# Patient Record
Sex: MALE | Race: BLACK OR AFRICAN AMERICAN | NOT HISPANIC OR LATINO | URBAN - METROPOLITAN AREA
[De-identification: names, ages, dates, MRNs, and addresses within clinical notes are randomized per-mention and may not be internally consistent; named-entity substitution may affect disease eponyms.]

---

## 2021-08-10 ENCOUNTER — OUTPATIENT (OUTPATIENT)
Dept: OUTPATIENT SERVICES | Facility: HOSPITAL | Age: 66
LOS: 1 days | End: 2021-08-10
Payer: COMMERCIAL

## 2021-08-10 PROCEDURE — 73620 X-RAY EXAM OF FOOT: CPT

## 2021-08-10 PROCEDURE — 73620 X-RAY EXAM OF FOOT: CPT | Mod: 26,RT

## 2021-08-19 PROBLEM — Z00.00 ENCOUNTER FOR PREVENTIVE HEALTH EXAMINATION: Status: ACTIVE | Noted: 2021-08-19

## 2021-08-24 ENCOUNTER — APPOINTMENT (OUTPATIENT)
Dept: PHYSICAL MEDICINE AND REHAB | Facility: CLINIC | Age: 66
End: 2021-08-24
Payer: COMMERCIAL

## 2021-08-24 VITALS
TEMPERATURE: 97.5 F | WEIGHT: 229 LBS | BODY MASS INDEX: 29.39 KG/M2 | HEART RATE: 58 BPM | HEIGHT: 74 IN | DIASTOLIC BLOOD PRESSURE: 78 MMHG | SYSTOLIC BLOOD PRESSURE: 140 MMHG | RESPIRATION RATE: 18 BRPM

## 2021-08-24 DIAGNOSIS — Z86.79 PERSONAL HISTORY OF OTHER DISEASES OF THE CIRCULATORY SYSTEM: ICD-10-CM

## 2021-08-24 DIAGNOSIS — Z86.39 PERSONAL HISTORY OF OTHER ENDOCRINE, NUTRITIONAL AND METABOLIC DISEASE: ICD-10-CM

## 2021-08-24 DIAGNOSIS — Z86.69 PERSONAL HISTORY OF OTHER DISEASES OF THE NERVOUS SYSTEM AND SENSE ORGANS: ICD-10-CM

## 2021-08-24 DIAGNOSIS — M89.8X6 OTHER SPECIFIED DISORDERS OF BONE, LOWER LEG: ICD-10-CM

## 2021-08-24 DIAGNOSIS — M79.89 OTHER SPECIFIED SOFT TISSUE DISORDERS: ICD-10-CM

## 2021-08-24 DIAGNOSIS — Z87.891 PERSONAL HISTORY OF NICOTINE DEPENDENCE: ICD-10-CM

## 2021-08-24 DIAGNOSIS — R29.898 OTHER SYMPTOMS AND SIGNS INVOLVING THE MUSCULOSKELETAL SYSTEM: ICD-10-CM

## 2021-08-24 DIAGNOSIS — M79.671 PAIN IN RIGHT FOOT: ICD-10-CM

## 2021-08-24 PROCEDURE — 99244 OFF/OP CNSLTJ NEW/EST MOD 40: CPT

## 2021-08-24 RX ORDER — PREDNISONE 10 MG
TABLET ORAL
Refills: 0 | Status: ACTIVE | COMMUNITY

## 2021-08-24 RX ORDER — NEBIVOLOL HYDROCHLORIDE 2.5 MG/1
2.5 TABLET ORAL
Refills: 0 | Status: ACTIVE | COMMUNITY

## 2021-08-24 RX ORDER — ASPIRIN 81 MG
TABLET,CHEWABLE ORAL
Refills: 0 | Status: ACTIVE | COMMUNITY

## 2021-08-24 RX ORDER — NICOTINE POLACRILEX 2 MG
GUM BUCCAL
Refills: 0 | Status: ACTIVE | COMMUNITY

## 2021-08-24 RX ORDER — HYDROCHLOROTHIAZIDE 25 MG/1
25 TABLET ORAL
Refills: 0 | Status: ACTIVE | COMMUNITY

## 2021-08-24 NOTE — ASSESSMENT
[FreeTextEntry1] : \par PLAN AND RECOMMENDATIONS :\par \par We discussed differential diagnosis and clinical impression\par agree with EMG \par rule out TTS vs PN \par  however I believe he has ongoing acute gout \par need to rule out stress fx despite neg xrays may read Nl and miss hairline crack \par will get US of R foot \par \par \par Recommend\par .symptomatic care and support\par  medications if symptoms flare advised to restart prednisone \par  imaging US R foot \par  referral to podiatry as per PCP -called her to discuss patient Dr La seeing him thursday \par  hydrotherapy /heat / cold for pain advise ice BID at least foot very warm \par  continue  WB precautions including care with bending, lifting, twisting and  carrying.\par \par  relative rest and avoidance of painful activity where possible \par  increasing activity as discussed \par  return for  EMG \par Information given to patient about EMG and Nerve Conduction Study Examination including  planning, differential diagnosis to rule in /rule out ,duration of the test ,precautions (if patient on blood thinner.has bleeding disorder or  pace maker device etc -still possible to undergo with care), side effects(benign-limited to short term bruising and discomfort/pain)  \par The protocol of temp checks upon arrival ,disinfection procedure of waiting room and the lab explained- reassured. \par All questions answered. \par Patient instructed to book appointment upon conclusion of appointment\par \par Information sheet ' Answers to your Questions on EMG " forwarded to patient to read prior to testing, with further information about training,background and the procedure itself .\par I certify that > 50 % of time spent was spent on counselling and coordination of care and more than 50% face to face directly \par Time spent including review of documents /records/decision making /discussion  amounted  > 60 minutes \par \par he is also deconditioned and would like to attend PT here -discussed but advised to wait until R foot asymptomatic

## 2021-08-24 NOTE — CONSULT LETTER
[FreeTextEntry1] : Dear Dr. SIMI MELCHOR  , \par \par I had the pleasure of evaluating your patient, NAY CARLSON .\par \par Thank you very much for allowing me to participate in the care of this patient. If you have any questions, please do not hesitate to contact me. \par \par Sincerely, \par Sam Covington MD \par \par ABPMR Board Certified in Physical Medicine and Rehabilitation\par Certified Fellow of AANEM (Neuromuscular and Electrodiagnostic Medicine)\par Subspecialty certified in Sports Medicine (ABPMR)\par \par  of Physical Medicine and Rehabilitation\par [facility]\par [facility]\par \par

## 2021-08-24 NOTE — REVIEW OF SYSTEMS
[Patient Intake Form Reviewed] : Patient intake form was reviewed [Fever] : no fever [Chills] : no chills [Fatigue] : no fatigue [Recent Change In Weight] : ~T recent weight change [Lower Ext Edema] : lower extremity edema [Diarrhea] : diarrhea [Joint Pain] : joint pain [Muscle Pain] : muscle pain [Difficulty Walking] : difficulty walking [Negative] : Heme/Lymph [FreeTextEntry2] : gained with pandemic no gym  [FreeTextEntry7] : had side effect with gout meds now ok  [FreeTextEntry9] : MCP area

## 2021-08-24 NOTE — HISTORY OF PRESENT ILLNESS
[FreeTextEntry1] : Mr. NAY CARLSON is a very pleasant 66 year male who seen for evaluation of  acute severe R foot pain  that has been ongoing for 4 weeks   without any specific injury or inciting event. but started after doing push ups and hyperextended his R big toe \par his PMH is high BMI 29 and pre diabetic Hg a1c 6 -he has  high lipids and BP also \par he had elevated uric acid years ago so propensity to gout \par saw his PCP started colchicine had Gi upset \par then gabapentin did not agree with him \par prednsone taper helped stopped it a week ago \par \par  The pain is still  located primarily R big toe and forefoot  intermittent in nature and described as aching sharp  . The pain is rated as 6/10 during today's visit, and ranges from  4-10/10. The patient's symptoms are aggravated by lying down   and alleviated by rest heat and ice  . The patient denies any other night pain, numbness/tingling, weakness, or bowel/bladder dysfunction. The patient has no other complaints at this time.\par he works in bankruptcy filing for [place] \par he has not worked out for 18 months since pandemic -used tp all the time \par xrays by PCP neg for fx \par

## 2021-08-24 NOTE — PHYSICAL EXAM
[FreeTextEntry1] : PHYSICAL EXAM : OBJECTIVE \par \par GENERAL : Awake ,alert and oriented to time place and person \par HEAD : normocephalic and atraumatic \par NECK : supple ,no tracheal deviation ,no thyroid enlargement noted with swallowing\par EYES : sclera and conjunctiva normal no redness,intact extraocular movements \par ENT  : ears and nose normal in appearance -hearing adequate \par PULMONARY: effort normal. No respiratory distress. breathing regular. No wheezes \par LYMPH : No swelling in limbs, capillary return within normal range \par CVS : warm extremities,no palpitations,not short of breath, no visible jugular venous distention\par PSYCH : mood and affect normal ,good eye contact ,normal attention \par ABDOMEN : no visible distension , \par NEUROLOGICAL:cranial nerves intact,muscle tone normal,gait and balance safe except where noted below \par SKIN : warm and dry No rash detected over specific body areas examined \par MUSCULOSKELETAL: normal muscle bulk, no focal bony tenderness /posture normal except where specified below\par R forefoot warm and swollen no redness\par tender over 2 nd MCP \par tender EHL tendon R \par \par gait NL \par ankle WNR \par No long tract signs found on clinical exam and no focal neurological deficits\par no tophi \par hips stiff \par hard to take off socks \par sensation intact foot \par

## 2021-08-27 ENCOUNTER — APPOINTMENT (OUTPATIENT)
Dept: ULTRASOUND IMAGING | Facility: CLINIC | Age: 66
End: 2021-08-27
Payer: COMMERCIAL

## 2021-08-27 ENCOUNTER — OUTPATIENT (OUTPATIENT)
Dept: OUTPATIENT SERVICES | Facility: HOSPITAL | Age: 66
LOS: 1 days | End: 2021-08-27

## 2021-08-27 ENCOUNTER — APPOINTMENT (OUTPATIENT)
Dept: PHYSICAL MEDICINE AND REHAB | Facility: CLINIC | Age: 66
End: 2021-08-27
Payer: COMMERCIAL

## 2021-08-27 VITALS
HEART RATE: 74 BPM | WEIGHT: 229 LBS | RESPIRATION RATE: 17 BRPM | SYSTOLIC BLOOD PRESSURE: 140 MMHG | TEMPERATURE: 98.1 F | HEIGHT: 74 IN | BODY MASS INDEX: 29.39 KG/M2 | DIASTOLIC BLOOD PRESSURE: 80 MMHG

## 2021-08-27 DIAGNOSIS — M54.16 RADICULOPATHY, LUMBAR REGION: ICD-10-CM

## 2021-08-27 DIAGNOSIS — G60.8 OTHER HEREDITARY AND IDIOPATHIC NEUROPATHIES: ICD-10-CM

## 2021-08-27 PROCEDURE — 95911 NRV CNDJ TEST 9-10 STUDIES: CPT

## 2021-08-27 PROCEDURE — 76881 US COMPL JOINT R-T W/IMG: CPT | Mod: 26,RT

## 2021-08-27 PROCEDURE — 95886 MUSC TEST DONE W/N TEST COMP: CPT

## 2021-09-23 ENCOUNTER — NON-APPOINTMENT (OUTPATIENT)
Age: 66
End: 2021-09-23

## 2021-09-24 ENCOUNTER — NON-APPOINTMENT (OUTPATIENT)
Age: 66
End: 2021-09-24

## 2021-09-24 ENCOUNTER — APPOINTMENT (OUTPATIENT)
Dept: RHEUMATOLOGY | Facility: CLINIC | Age: 66
End: 2021-09-24
Payer: COMMERCIAL

## 2021-09-24 VITALS
DIASTOLIC BLOOD PRESSURE: 83 MMHG | HEIGHT: 74 IN | TEMPERATURE: 97.7 F | WEIGHT: 229 LBS | SYSTOLIC BLOOD PRESSURE: 142 MMHG | OXYGEN SATURATION: 97 % | BODY MASS INDEX: 29.39 KG/M2 | HEART RATE: 61 BPM

## 2021-09-24 DIAGNOSIS — M10.9 GOUT, UNSPECIFIED: ICD-10-CM

## 2021-09-24 DIAGNOSIS — Z13.1 ENCOUNTER FOR SCREENING FOR DIABETES MELLITUS: ICD-10-CM

## 2021-09-24 PROCEDURE — 99204 OFFICE O/P NEW MOD 45 MIN: CPT | Mod: 25

## 2021-09-24 PROCEDURE — 36415 COLL VENOUS BLD VENIPUNCTURE: CPT

## 2021-09-27 NOTE — PHYSICAL EXAM
[General Appearance - Alert] : alert [General Appearance - In No Acute Distress] : in no acute distress [General Appearance - Well Nourished] : well nourished [General Appearance - Well-Appearing] : healthy appearing [Sclera] : the sclera and conjunctiva were normal [] : no respiratory distress [Respiration, Rhythm And Depth] : normal respiratory rhythm and effort [Exaggerated Use Of Accessory Muscles For Inspiration] : no accessory muscle use [Edema] : there was no peripheral edema [Abnormal Walk] : normal gait [Nail Clubbing] : no clubbing  or cyanosis of the fingernails [Musculoskeletal - Swelling] : no joint swelling seen [Motor Tone] : muscle strength and tone were normal [Oriented To Time, Place, And Person] : oriented to person, place, and time [Impaired Insight] : insight and judgment were intact [Affect] : the affect was normal [FreeTextEntry1] : No tophi, no tender joints

## 2021-09-27 NOTE — DATA REVIEWED
[FreeTextEntry1] : \par  US Joint Complete, Right             Final\par \par No Documents Attached\par \par \par \par \par   EXAM:  US JOINT COMPLETE RT\par \par PROCEDURE DATE:  08/27/2021\par \par \par \par \par INTERPRETATION:  CLINICAL INDICATION: Foot pain\par \par EXAM: Ultrasound of the right foot with attention to the first digit was performed utilizing grayscale and color Doppler.\par \par COMPARISON: Foot radiograph 8/10/2021 was reviewed\par \par \par IMPRESSION:\par No evidence of cortical disruption to suggest a stress fracture of the first through fifth metatarsals and proximal and distal phalanges of the first digit. There is a first metatarsophalangeal joint effusion with synovitis. There is a trace first interphalangeal joint effusion.\par There is heterogeneous signal with focal fluid along the medial first metatarsal head with adjacent hyperemia, which may represent a tophus from gout versus extensive enthesopathy. There is trace fluid about the extensor hallucis tendon at the level of the first metatarsal head without tear.\par \par --- End of Report ---\par \par \par \par \par \par \par JENA MORRIS MD; Attending Radiologist\par This document has been electronically signed. Aug 27 2021  3:07PM\par \par  \par \par  Ordered by: TONY WALSH       Collected/Examined: [id number] 10:01AM       \par Verified by: TONY WALSH [id number] 08:19PM       \par  Result Communication: No patient communication needed at this time;\par Stage: Final       \par  Performed at: [facility]       Resulted: [id number] 01:09PM       Last Updated: 07Sep2021 08:19PM       Accession: [id number]       \par \par EXAM: XR FOOT 2 VIEWS RT\par \par PROCEDURE DATE: 08/10/2021\par \par \par \par INTERPRETATION: CLINICAL HISTORY: 66-year-old with pain.\par \par \par \par IMPRESSION: Frontal and lateral views of the right foot demonstrate no fracture no dislocation. No evidence of erosive change. Os peroneum versus calcification of prior peroneal tendon injury. Tarsometatarsal joint is in appropriate alignment. Narrowing of the second, third and fourth DIP joints. Plantar calcaneal spurring.\par \par \par \par \par \par \par Dr. Andre Barnes can be reached at [email or web address] with any questions regarding this report.\par \par --- End of Report ---\par \par \par \par \par Thank you for the opportunity to participate in the care of this patient.\par \par \par ANDRE BARNES MD; Attending Radiologist\par This document has been electronically signed. Aug 11 2021 11:00AM

## 2021-09-27 NOTE — ASSESSMENT
[FreeTextEntry1] : 66 year old man referred for rheumatology evaluation.  Patient with recent diagnosis of acute gouty arthritis of the first toe, crystal proven, now improved. Discussed role of colchicine for prophylaxis Discussed discontinuation of HCTZ. Discussed role of colchicine for prophylaxis, patient will refill medication to have in case of recurrence of flare.  Will check labs today, including uric acid.  Patient requesting hbA1c as well, will check today.  Discussed adequate hydration and low purine diet, avoid high protein supplements as well. Patient will return in six weeks or sooner if symptoms change or worsen to reevaluate.

## 2021-09-27 NOTE — HISTORY OF PRESENT ILLNESS
[FreeTextEntry1] : 66 year old man referred for rheumatology evaluation  \par \par Patient with recent onset of right toe pain, \par Pain started after doing pushups on hard floor barefoot, initially thought hyperextended the first toe\par Was seen by PMD, referred to podiatrist. and physiatrist\par \par History of gout attack at 18 years of age\par \par During this episode, treated with colchicine 0.6 mg for about two weeks but stopped due to diarrhea\par Also treated with prednisone 40 mg tapering over a few days, but felt prednisone made it worse\par Initially right foot, then the left foot started to feel pain as well\par Dr. La, aspirated the joint, +uric acid crystals.  Patient also received steroid injection with great improvement in symptoms. \par Had EMG as well \par Had ultrasound of the toe, +inflammation and synovitis, report scanned in chart \par \par +ETOH but has since stopped\par Takes HCTZ for years, no change in dose\par Blood pressure elevated, BP at home 123/80, increases at doctor's office\par \par No history of nephrolithiasis\par No history of gout\par No history of allopurinol use\par Takes indomethacin for pain\par Pain better at this time.  No swelling of joint or dorsum of foot.\par Has been taking new supplements as well as recently returned to exercising.\par +family history of gout\par \par Reviewed imaging\par Reviewed arthrocentesis results

## 2021-09-28 ENCOUNTER — NON-APPOINTMENT (OUTPATIENT)
Age: 66
End: 2021-09-28

## 2021-09-28 LAB
ALBUMIN SERPL ELPH-MCNC: 4.9 G/DL
ALP BLD-CCNC: 83 U/L
ALT SERPL-CCNC: 30 U/L
ANION GAP SERPL CALC-SCNC: 13 MMOL/L
AST SERPL-CCNC: 26 U/L
BASOPHILS # BLD AUTO: 0.03 K/UL
BASOPHILS NFR BLD AUTO: 0.6 %
BILIRUB SERPL-MCNC: 0.7 MG/DL
BUN SERPL-MCNC: 21 MG/DL
CALCIUM SERPL-MCNC: 9.8 MG/DL
CHLORIDE SERPL-SCNC: 99 MMOL/L
CO2 SERPL-SCNC: 27 MMOL/L
CREAT SERPL-MCNC: 1.15 MG/DL
CRP SERPL-MCNC: <3 MG/L
EOSINOPHIL # BLD AUTO: 0.06 K/UL
EOSINOPHIL NFR BLD AUTO: 1.3 %
ERYTHROCYTE [SEDIMENTATION RATE] IN BLOOD BY WESTERGREN METHOD: 9 MM/HR
ESTIMATED AVERAGE GLUCOSE: 137 MG/DL
GLUCOSE SERPL-MCNC: 216 MG/DL
HBA1C MFR BLD HPLC: 6.4 %
HCT VFR BLD CALC: 37.2 %
HGB BLD-MCNC: 12.3 G/DL
IMM GRANULOCYTES NFR BLD AUTO: 0.2 %
LYMPHOCYTES # BLD AUTO: 1.78 K/UL
LYMPHOCYTES NFR BLD AUTO: 38 %
MAN DIFF?: NORMAL
MCHC RBC-ENTMCNC: 32.7 PG
MCHC RBC-ENTMCNC: 33.1 GM/DL
MCV RBC AUTO: 98.9 FL
MONOCYTES # BLD AUTO: 0.31 K/UL
MONOCYTES NFR BLD AUTO: 6.6 %
NEUTROPHILS # BLD AUTO: 2.5 K/UL
NEUTROPHILS NFR BLD AUTO: 53.3 %
PLATELET # BLD AUTO: 303 K/UL
POTASSIUM SERPL-SCNC: 3.6 MMOL/L
PROT SERPL-MCNC: 7.7 G/DL
RBC # BLD: 3.76 M/UL
RBC # FLD: 12.5 %
SODIUM SERPL-SCNC: 139 MMOL/L
URATE SERPL-MCNC: 7.4 MG/DL
VIT B12 SERPL-MCNC: 1024 PG/ML
WBC # FLD AUTO: 4.69 K/UL

## 2021-11-12 ENCOUNTER — APPOINTMENT (OUTPATIENT)
Dept: PHYSICAL MEDICINE AND REHAB | Facility: CLINIC | Age: 66
End: 2021-11-12

## 2022-05-03 ENCOUNTER — APPOINTMENT (OUTPATIENT)
Dept: INTERNAL MEDICINE | Facility: CLINIC | Age: 67
End: 2022-05-03

## 2022-05-05 ENCOUNTER — APPOINTMENT (OUTPATIENT)
Dept: INTERNAL MEDICINE | Facility: CLINIC | Age: 67
End: 2022-05-05

## 2022-05-05 VITALS — BODY MASS INDEX: 28.1 KG/M2 | HEIGHT: 73 IN | WEIGHT: 212 LBS

## 2022-05-05 DIAGNOSIS — E11.9 TYPE 2 DIABETES MELLITUS W/OUT COMPLICATIONS: ICD-10-CM

## 2022-05-05 PROCEDURE — 97802 MEDICAL NUTRITION INDIV IN: CPT

## 2022-07-12 ENCOUNTER — APPOINTMENT (OUTPATIENT)
Dept: INTERNAL MEDICINE | Facility: CLINIC | Age: 67
End: 2022-07-12

## 2022-09-01 ENCOUNTER — APPOINTMENT (OUTPATIENT)
Dept: ENDOCRINOLOGY | Facility: CLINIC | Age: 67
End: 2022-09-01

## 2022-09-01 VITALS — HEIGHT: 74 IN | WEIGHT: 201 LBS | BODY MASS INDEX: 25.8 KG/M2

## 2022-09-01 PROCEDURE — 97802 MEDICAL NUTRITION INDIV IN: CPT

## 2024-09-10 ENCOUNTER — NON-APPOINTMENT (OUTPATIENT)
Age: 69
End: 2024-09-10

## 2024-09-11 ENCOUNTER — APPOINTMENT (OUTPATIENT)
Dept: ENDOCRINOLOGY | Facility: CLINIC | Age: 69
End: 2024-09-11
Payer: COMMERCIAL

## 2024-09-11 ENCOUNTER — RESULT CHARGE (OUTPATIENT)
Age: 69
End: 2024-09-11

## 2024-09-11 VITALS
WEIGHT: 218 LBS | SYSTOLIC BLOOD PRESSURE: 155 MMHG | HEART RATE: 64 BPM | BODY MASS INDEX: 27.98 KG/M2 | DIASTOLIC BLOOD PRESSURE: 89 MMHG | HEIGHT: 74 IN

## 2024-09-11 DIAGNOSIS — Z83.438 FAMILY HISTORY OF OTHER DISORDER OF LIPOPROTEIN METABOLISM AND OTHER LIPIDEMIA: ICD-10-CM

## 2024-09-11 DIAGNOSIS — Z83.3 FAMILY HISTORY OF DIABETES MELLITUS: ICD-10-CM

## 2024-09-11 DIAGNOSIS — E11.9 TYPE 2 DIABETES MELLITUS W/OUT COMPLICATIONS: ICD-10-CM

## 2024-09-11 DIAGNOSIS — Z82.49 FAMILY HISTORY OF ISCHEMIC HEART DISEASE AND OTHER DISEASES OF THE CIRCULATORY SYSTEM: ICD-10-CM

## 2024-09-11 DIAGNOSIS — I10 ESSENTIAL (PRIMARY) HYPERTENSION: ICD-10-CM

## 2024-09-11 LAB
GLUCOSE BLDC GLUCOMTR-MCNC: 116
HBA1C MFR BLD HPLC: 5.1

## 2024-09-11 PROCEDURE — 99205 OFFICE O/P NEW HI 60 MIN: CPT | Mod: GC,25

## 2024-09-11 PROCEDURE — 36415 COLL VENOUS BLD VENIPUNCTURE: CPT

## 2024-09-11 RX ORDER — LATANOPROST/PF 0.005 %
0.01 DROPS OPHTHALMIC (EYE)
Refills: 0 | Status: ACTIVE | COMMUNITY

## 2024-09-11 RX ORDER — ROSUVASTATIN CALCIUM 10 MG/1
10 TABLET, FILM COATED ORAL
Refills: 0 | Status: ACTIVE | COMMUNITY

## 2024-09-11 RX ORDER — AMLODIPINE BESYLATE 10 MG/1
10 TABLET ORAL
Refills: 0 | Status: ACTIVE | COMMUNITY

## 2024-09-11 RX ORDER — TIMOLOL MALEATE 5 MG/ML
0.5 SOLUTION/ DROPS OPHTHALMIC
Refills: 0 | Status: ACTIVE | COMMUNITY

## 2024-09-11 RX ORDER — DULAGLUTIDE 1.5 MG/.5ML
1.5 INJECTION, SOLUTION SUBCUTANEOUS
Refills: 0 | Status: ACTIVE | COMMUNITY

## 2024-09-11 RX ORDER — BRIMONIDINE TARTRATE 1 MG/ML
0.1 SOLUTION/ DROPS OPHTHALMIC
Refills: 0 | Status: ACTIVE | COMMUNITY

## 2024-09-11 RX ORDER — NEBIVOLOL HYDROCHLORIDE 5 MG/1
5 TABLET ORAL
Refills: 0 | Status: ACTIVE | COMMUNITY

## 2024-09-11 NOTE — ADDENDUM
[FreeTextEntry1] : Attending:  Pt seen with Dr. Nova.  Pt with a 5-yr history of type 2 DM, managed just with Trulicity 1.5 mg.  Glycemic control is excellent by A1c level (5.1% by POC testing today).  Monitors fingersticks infrequently at home, but says that the few post-prandials he checks are below 140 (and usually below 120). BP is distinctly elevated today but he says that readings which he checks at home are distinctly lower. Nonetheless, urine microalbumin ratio is moderately positive, and he is not on an ACE or ARB at this point.  This was discussed with him, and we will start a low dose of lisinopril today (after checking K level) Diet is extremely low in carb at this point. Will continue the Trulicity at the present dose--there is no indication to raise it A1c level is also suspiciously low--?? 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  To check a fructosamine level with his next bloodwork.  YVETTE Bright MD

## 2024-09-11 NOTE — ASSESSMENT
[FreeTextEntry1] : # Type 2 DM # BMI 28 - podiatry and ophthalmology appts up to date - diet decent - low carbohydrate and high protein diet - engaging in exercise moderate intensity daily - pt states he does not want to lose more weight - c/w current Trulicity dose, will not change the dose, he has supplies still - noted to have ACR 79 - 3-4 months f/u  # HTN - goal should be under 120/80 - will check BMP today - if potassium is wnl, start lisinopril 5mg daily - counselled on side effects dry cough, advise to start with 1/2 tablet daily first - cardiologist is Dr. Harinder Torre - will fax the note to his office -  [phone]  d/w Dr. Bright

## 2024-09-11 NOTE — HISTORY OF PRESENT ILLNESS
[FreeTextEntry1] : 68 yo M with HTN, HLD, Type 2 DM here for established care  Diagnosed with DM about 4-5 years ago A1c at diagnosis was about 8-9 - thinks the amlodipine triggered DM Currently on Trulicity 1.5 mg weekly history of other regimen:  did not tolerate metformin - gave terrible stomach cramps, insulin 1-2 years - off 3 years ago About a year now Trulicity 1.5 now??, was previously patient of Dr. 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

## 2024-09-11 NOTE — PHYSICAL EXAM
[Alert] : alert [Well Nourished] : well nourished [No Acute Distress] : no acute distress [Well Developed] : well developed [Normal Sclera/Conjunctiva] : normal sclera/conjunctiva [EOMI] : extra ocular movement intact [No Proptosis] : no proptosis [Normal Oropharynx] : the oropharynx was normal [Thyroid Not Enlarged] : the thyroid was not enlarged [No Thyroid Nodules] : no palpable thyroid nodules [No Respiratory Distress] : no respiratory distress [No Accessory Muscle Use] : no accessory muscle use [Clear to Auscultation] : lungs were clear to auscultation bilaterally [Normal S1, S2] : normal S1 and S2 [Normal Rate] : heart rate was normal [Regular Rhythm] : with a regular rhythm [No Edema] : no peripheral edema [Pedal Pulses Normal] : the pedal pulses are present [Normal Bowel Sounds] : normal bowel sounds [Not Tender] : non-tender [Not Distended] : not distended [Soft] : abdomen soft [Normal Posterior Cervical Nodes] : no posterior cervical lymphadenopathy [No Spinal Tenderness] : no spinal tenderness [Spine Straight] : spine straight [No Stigmata of Cushings Syndrome] : no stigmata of Cushings Syndrome [Normal Gait] : normal gait [Normal Reflexes] : deep tendon reflexes were 2+ and symmetric [No Tremors] : no tremors [Oriented x3] : oriented to person, place, and time [Normal Sensation on Monofilament Testing] : normal sensation on monofilament testing of lower extremities

## 2024-09-12 LAB
ALBUMIN SERPL ELPH-MCNC: 5.1 G/DL
ALP BLD-CCNC: 67 U/L
ALT SERPL-CCNC: 32 U/L
ANION GAP SERPL CALC-SCNC: 12 MMOL/L
AST SERPL-CCNC: 31 U/L
BILIRUB SERPL-MCNC: 0.8 MG/DL
BUN SERPL-MCNC: 18 MG/DL
CALCIUM SERPL-MCNC: 9.7 MG/DL
CHLORIDE SERPL-SCNC: 103 MMOL/L
CHOLEST SERPL-MCNC: 153 MG/DL
CO2 SERPL-SCNC: 28 MMOL/L
CREAT SERPL-MCNC: 1.22 MG/DL
CREAT SPEC-SCNC: 225 MG/DL
EGFR: 63 ML/MIN/1.73M2
GLUCOSE SERPL-MCNC: 98 MG/DL
HDLC SERPL-MCNC: 61 MG/DL
LDLC SERPL CALC-MCNC: 67 MG/DL
MICROALBUMIN 24H UR DL<=1MG/L-MCNC: 9.4 MG/DL
MICROALBUMIN/CREAT 24H UR-RTO: 42 MG/G
NONHDLC SERPL-MCNC: 92 MG/DL
POTASSIUM SERPL-SCNC: 4.3 MMOL/L
PROT SERPL-MCNC: 7.5 G/DL
SODIUM SERPL-SCNC: 143 MMOL/L
TRIGL SERPL-MCNC: 150 MG/DL
TSH SERPL-ACNC: 1.47 UIU/ML

## 2024-09-12 RX ORDER — LISINOPRIL 5 MG/1
5 TABLET ORAL DAILY
Qty: 60 | Refills: 2 | Status: ACTIVE | COMMUNITY
Start: 2024-09-12 | End: 1900-01-01

## 2024-12-11 ENCOUNTER — RESULT CHARGE (OUTPATIENT)
Age: 69
End: 2024-12-11

## 2024-12-11 ENCOUNTER — APPOINTMENT (OUTPATIENT)
Dept: ENDOCRINOLOGY | Facility: CLINIC | Age: 69
End: 2024-12-11
Payer: COMMERCIAL

## 2024-12-11 VITALS
BODY MASS INDEX: 27.6 KG/M2 | HEART RATE: 79 BPM | WEIGHT: 215 LBS | DIASTOLIC BLOOD PRESSURE: 76 MMHG | SYSTOLIC BLOOD PRESSURE: 129 MMHG

## 2024-12-11 DIAGNOSIS — R80.9 PROTEINURIA, UNSPECIFIED: ICD-10-CM

## 2024-12-11 DIAGNOSIS — E11.9 TYPE 2 DIABETES MELLITUS W/OUT COMPLICATIONS: ICD-10-CM

## 2024-12-11 LAB
GLUCOSE BLDC GLUCOMTR-MCNC: 99
HBA1C MFR BLD HPLC: 5.1

## 2024-12-11 PROCEDURE — 36415 COLL VENOUS BLD VENIPUNCTURE: CPT

## 2024-12-11 PROCEDURE — 99214 OFFICE O/P EST MOD 30 MIN: CPT | Mod: 25

## 2024-12-12 LAB
ANION GAP SERPL CALC-SCNC: 13 MMOL/L
BUN SERPL-MCNC: 17 MG/DL
CALCIUM SERPL-MCNC: 9.6 MG/DL
CHLORIDE SERPL-SCNC: 105 MMOL/L
CO2 SERPL-SCNC: 25 MMOL/L
CREAT SERPL-MCNC: 1.17 MG/DL
EGFR: 67 ML/MIN/1.73M2
GLUCOSE SERPL-MCNC: 94 MG/DL
POTASSIUM SERPL-SCNC: 4.3 MMOL/L
SODIUM SERPL-SCNC: 143 MMOL/L

## 2025-03-04 ENCOUNTER — APPOINTMENT (OUTPATIENT)
Dept: NEPHROLOGY | Facility: CLINIC | Age: 70
End: 2025-03-04

## 2025-05-19 ENCOUNTER — APPOINTMENT (OUTPATIENT)
Dept: HEMATOLOGY ONCOLOGY | Facility: CLINIC | Age: 70
End: 2025-05-19

## 2025-07-22 ENCOUNTER — APPOINTMENT (OUTPATIENT)
Dept: ENDOCRINOLOGY | Facility: CLINIC | Age: 70
End: 2025-07-22